# Patient Record
Sex: MALE
[De-identification: names, ages, dates, MRNs, and addresses within clinical notes are randomized per-mention and may not be internally consistent; named-entity substitution may affect disease eponyms.]

---

## 2023-02-13 ENCOUNTER — APPOINTMENT (OUTPATIENT)
Dept: NEUROSURGERY | Facility: CLINIC | Age: 48
End: 2023-02-13
Payer: COMMERCIAL

## 2023-02-13 ENCOUNTER — APPOINTMENT (OUTPATIENT)
Dept: PAIN MANAGEMENT | Facility: CLINIC | Age: 48
End: 2023-02-13

## 2023-02-13 DIAGNOSIS — M47.812 SPONDYLOSIS W/OUT MYELOPATHY OR RADICULOPATHY, CERVICAL REGION: ICD-10-CM

## 2023-02-13 PROBLEM — Z00.00 ENCOUNTER FOR PREVENTIVE HEALTH EXAMINATION: Status: ACTIVE | Noted: 2023-02-13

## 2023-02-13 PROCEDURE — 99204 OFFICE O/P NEW MOD 45 MIN: CPT

## 2023-02-13 NOTE — PHYSICAL EXAM
[de-identified] :  Positive Spurling on the right. Pain increased on flex, no change on ext. No weakness on examination.

## 2023-02-13 NOTE — HISTORY OF PRESENT ILLNESS
[de-identified] : Mr. Morel presents today as a 48 year old man for neurosurgical consult. He presents with an MRI of the cervical spine for review. Impression of cervical spondylosis worse at C6-7 on the right. He has a chief complaint of neck pain. He suffers from right C7 radiculopathy. Pain in the neck began about 10 days ago, no injury or incident noted. Patient has paraesthesia in the two fingers on the right, the index and middle fingers. He also complains of pain in the right Tricep area and pain in the neck, right worse than left. He has trialed Medrol Dose Patrick without any relief. He did trial Naproxen without any relief.

## 2023-02-13 NOTE — DISCUSSION/SUMMARY
[de-identified] : Mr. Morel presents today as a 48 year old man for neurosurgical consult. He suffers from neck pain with right C7 radiculopathy. I am prescribing the patient Gabapentin 300mg at night. I am also referring him to pain management for consideration of cervical injection therapy. I will see the patient back for follow-up.\par \par I, Enio Natarajan, attest that this documentation has been prepared under the direction and in the presence of Provider Mahamed Hager MD\par  \par Thank you for allowing me to assist in the care of this patient. \par  \par Mahamed Hager MD\par \par Board Certified , Neurosurgeon\par \par

## 2023-02-14 RX ORDER — GABAPENTIN 300 MG/1
300 CAPSULE ORAL
Qty: 30 | Refills: 1 | Status: ACTIVE | COMMUNITY
Start: 2023-02-14 | End: 1900-01-01

## 2023-02-14 RX ORDER — ESOMEPRAZOLE MAGNESIUM 40 MG/1
40 CAPSULE, DELAYED RELEASE ORAL DAILY
Qty: 7 | Refills: 0 | Status: ACTIVE | COMMUNITY
Start: 2023-02-14 | End: 1900-01-01

## 2023-02-14 RX ORDER — METHYLPREDNISOLONE 4 MG/1
4 TABLET ORAL
Qty: 21 | Refills: 0 | Status: ACTIVE | COMMUNITY
Start: 2023-02-14 | End: 1900-01-01